# Patient Record
Sex: FEMALE | Race: BLACK OR AFRICAN AMERICAN | NOT HISPANIC OR LATINO | URBAN - NONMETROPOLITAN AREA
[De-identification: names, ages, dates, MRNs, and addresses within clinical notes are randomized per-mention and may not be internally consistent; named-entity substitution may affect disease eponyms.]

---

## 2019-08-08 ENCOUNTER — IMPORTED ENCOUNTER (OUTPATIENT)
Dept: URBAN - NONMETROPOLITAN AREA CLINIC 1 | Facility: CLINIC | Age: 29
End: 2019-08-08

## 2019-08-08 PROBLEM — H00.021: Noted: 2019-08-08

## 2019-08-08 PROBLEM — H00.12: Noted: 2019-08-13

## 2019-08-08 PROCEDURE — 99203 OFFICE O/P NEW LOW 30 MIN: CPT

## 2019-08-08 NOTE — PATIENT DISCUSSION
Chalazion/HORDEOLUM RUL:-Recommend pt begin warm compresses and lid scrubs BID as well as artificial tears OU QID. Pt instructed on proper technique for lid scrubs. REFER FOR EXEC CONSULT WITH DR. Perez Flavin

## 2019-08-09 ENCOUNTER — IMPORTED ENCOUNTER (OUTPATIENT)
Dept: URBAN - NONMETROPOLITAN AREA CLINIC 1 | Facility: CLINIC | Age: 29
End: 2019-08-09

## 2019-08-09 PROCEDURE — 99213 OFFICE O/P EST LOW 20 MIN: CPT

## 2019-08-09 NOTE — PATIENT DISCUSSION
Chalazion/HORDEOLUM RUL:-Directed patient to start doxy 100mg BID x 1 week-R &B's discussed.-Schedule MOS  RUL hordeoleum.-1 hordeolum x RUL and  2 Chalazion RLLSchedule MOS for RUL first then re-evaluate RLL and possible schedule.

## 2019-09-13 ENCOUNTER — IMPORTED ENCOUNTER (OUTPATIENT)
Dept: URBAN - NONMETROPOLITAN AREA CLINIC 1 | Facility: CLINIC | Age: 29
End: 2019-09-13

## 2019-09-13 PROCEDURE — 67700 BLEPHAROTOMY DRG ABSC EYELID: CPT

## 2019-09-13 NOTE — PATIENT DISCUSSION
Chalazion/HORDEOLUM RUL:-Directed patient to start doxy 100mg BID x 1 week-R &B's discussed.-1 hordeolum x RUL and  2 Chalazion RLLPt elects to have procedure today for I&D of hordeolum on RUL. 1 week PO1 month MOS I&D Chalazion x 2 Right Lower Lid

## 2019-09-20 ENCOUNTER — IMPORTED ENCOUNTER (OUTPATIENT)
Dept: URBAN - NONMETROPOLITAN AREA CLINIC 1 | Facility: CLINIC | Age: 29
End: 2019-09-20

## 2019-09-20 PROCEDURE — 99024 POSTOP FOLLOW-UP VISIT: CPT

## 2020-01-02 ENCOUNTER — IMPORTED ENCOUNTER (OUTPATIENT)
Dept: URBAN - NONMETROPOLITAN AREA CLINIC 1 | Facility: CLINIC | Age: 30
End: 2020-01-02

## 2020-01-02 PROBLEM — H00.12: Noted: 2020-01-02

## 2020-01-02 PROCEDURE — 99213 OFFICE O/P EST LOW 20 MIN: CPT

## 2020-01-02 NOTE — PATIENT DISCUSSION
refer back to dr Mayuri Hill for Chalazion x 2 Right Lower Lidallergic conj os>>odeducate ptstart tobadex 1 gtt qid ou x 10 days

## 2020-02-28 ENCOUNTER — IMPORTED ENCOUNTER (OUTPATIENT)
Dept: URBAN - NONMETROPOLITAN AREA CLINIC 1 | Facility: CLINIC | Age: 30
End: 2020-02-28

## 2020-02-28 PROCEDURE — 67801 REMOVE EYELID LESIONS: CPT

## 2020-02-28 NOTE — PATIENT DISCUSSION
Janice (2) RLL Discussed w/ patient Pt elects to proceed w/ surgery to have removed RC 2 week POV w/ [place]

## 2020-03-12 ENCOUNTER — IMPORTED ENCOUNTER (OUTPATIENT)
Dept: URBAN - NONMETROPOLITAN AREA CLINIC 1 | Facility: CLINIC | Age: 30
End: 2020-03-12

## 2020-03-12 PROBLEM — H00.12: Noted: 2020-03-12

## 2020-03-12 PROBLEM — Z98.890: Noted: 2020-03-12

## 2020-03-12 PROCEDURE — 99212 OFFICE O/P EST SF 10 MIN: CPT

## 2022-04-10 ASSESSMENT — VISUAL ACUITY
OS_CC: 20/20
OD_SC: 20/20
OS_SC: 20/20
OS_SC: 20/20
OD_CC: 20/20
OD_SC: 20/20
OD_SC: 20/20
OS_SC: 20/20
OD_SC: 20/20
OD_CC: 20/20
OS_SC: 20/20
OS_CC: 20/20
OD_SC: 20/20
OS_SC: 20/20